# Patient Record
Sex: MALE | Race: WHITE
[De-identification: names, ages, dates, MRNs, and addresses within clinical notes are randomized per-mention and may not be internally consistent; named-entity substitution may affect disease eponyms.]

---

## 2019-11-07 ENCOUNTER — HOSPITAL ENCOUNTER (OUTPATIENT)
Dept: HOSPITAL 62 - ER | Age: 64
Setting detail: OBSERVATION
LOS: 2 days | Discharge: HOME | End: 2019-11-09
Attending: INTERNAL MEDICINE | Admitting: INTERNAL MEDICINE
Payer: MEDICARE

## 2019-11-07 DIAGNOSIS — Z91.19: ICD-10-CM

## 2019-11-07 DIAGNOSIS — E66.9: ICD-10-CM

## 2019-11-07 DIAGNOSIS — I89.0: ICD-10-CM

## 2019-11-07 DIAGNOSIS — R11.0: ICD-10-CM

## 2019-11-07 DIAGNOSIS — M62.81: ICD-10-CM

## 2019-11-07 DIAGNOSIS — I95.9: ICD-10-CM

## 2019-11-07 DIAGNOSIS — Z79.899: ICD-10-CM

## 2019-11-07 DIAGNOSIS — R94.31: ICD-10-CM

## 2019-11-07 DIAGNOSIS — R55: Primary | ICD-10-CM

## 2019-11-07 DIAGNOSIS — Z90.49: ICD-10-CM

## 2019-11-07 DIAGNOSIS — Z83.3: ICD-10-CM

## 2019-11-07 DIAGNOSIS — I10: ICD-10-CM

## 2019-11-07 DIAGNOSIS — G60.0: ICD-10-CM

## 2019-11-07 DIAGNOSIS — G47.33: ICD-10-CM

## 2019-11-07 LAB
ADD MANUAL DIFF: NO
ALBUMIN SERPL-MCNC: 3.9 G/DL (ref 3.5–5)
ALP SERPL-CCNC: 53 U/L (ref 38–126)
ANION GAP SERPL CALC-SCNC: 12 MMOL/L (ref 5–19)
APPEARANCE UR: (no result)
APTT PPP: YELLOW S
AST SERPL-CCNC: 24 U/L (ref 17–59)
BARBITURATES UR QL SCN: NEGATIVE
BASOPHILS # BLD AUTO: 0.1 10^3/UL (ref 0–0.2)
BASOPHILS NFR BLD AUTO: 0.8 % (ref 0–2)
BILIRUB DIRECT SERPL-MCNC: 0.2 MG/DL (ref 0–0.4)
BILIRUB SERPL-MCNC: 0.8 MG/DL (ref 0.2–1.3)
BILIRUB UR QL STRIP: NEGATIVE
BUN SERPL-MCNC: 10 MG/DL (ref 7–20)
CALCIUM: 8.9 MG/DL (ref 8.4–10.2)
CHLORIDE SERPL-SCNC: 97 MMOL/L (ref 98–107)
CK MB SERPL-MCNC: 4.59 NG/ML (ref ?–4.55)
CK SERPL-CCNC: 249 U/L (ref 55–170)
CO2 SERPL-SCNC: 25 MMOL/L (ref 22–30)
EOSINOPHIL # BLD AUTO: 0.2 10^3/UL (ref 0–0.6)
EOSINOPHIL NFR BLD AUTO: 1.6 % (ref 0–6)
ERYTHROCYTE [DISTWIDTH] IN BLOOD BY AUTOMATED COUNT: 13 % (ref 11.5–14)
ETHANOL SERPL-MCNC: < 10 MG/DL
GLUCOSE SERPL-MCNC: 126 MG/DL (ref 75–110)
GLUCOSE UR STRIP-MCNC: NEGATIVE MG/DL
HCT VFR BLD CALC: 40.4 % (ref 37.9–51)
HGB BLD-MCNC: 14.1 G/DL (ref 13.5–17)
KETONES UR STRIP-MCNC: NEGATIVE MG/DL
LYMPHOCYTES # BLD AUTO: 2.4 10^3/UL (ref 0.5–4.7)
LYMPHOCYTES NFR BLD AUTO: 24.9 % (ref 13–45)
MCH RBC QN AUTO: 32.4 PG (ref 27–33.4)
MCHC RBC AUTO-ENTMCNC: 34.8 G/DL (ref 32–36)
MCV RBC AUTO: 93 FL (ref 80–97)
METHADONE UR QL SCN: NEGATIVE
MONOCYTES # BLD AUTO: 0.9 10^3/UL (ref 0.1–1.4)
MONOCYTES NFR BLD AUTO: 8.8 % (ref 3–13)
NEUTROPHILS # BLD AUTO: 6.2 10^3/UL (ref 1.7–8.2)
NEUTS SEG NFR BLD AUTO: 63.9 % (ref 42–78)
NITRITE UR QL STRIP: NEGATIVE
PCP UR QL SCN: NEGATIVE
PH UR STRIP: 6 [PH] (ref 5–9)
PLATELET # BLD: 211 10^3/UL (ref 150–450)
POTASSIUM SERPL-SCNC: 4.1 MMOL/L (ref 3.6–5)
PROT SERPL-MCNC: 6.5 G/DL (ref 6.3–8.2)
PROT UR STRIP-MCNC: 30 MG/DL
RBC # BLD AUTO: 4.34 10^6/UL (ref 4.35–5.55)
SP GR UR STRIP: 1.01
TOTAL CELLS COUNTED % (AUTO): 100 %
TROPONIN I SERPL-MCNC: < 0.012 NG/ML
URINE AMPHETAMINES SCREEN: NEGATIVE
URINE BENZODIAZEPINES SCREEN: NEGATIVE
URINE COCAINE SCREEN: NEGATIVE
URINE MARIJUANA (THC) SCREEN: NEGATIVE
UROBILINOGEN UR-MCNC: 4 MG/DL (ref ?–2)
WBC # BLD AUTO: 9.8 10^3/UL (ref 4–10.5)

## 2019-11-07 PROCEDURE — 96374 THER/PROPH/DIAG INJ IV PUSH: CPT

## 2019-11-07 PROCEDURE — 72125 CT NECK SPINE W/O DYE: CPT

## 2019-11-07 PROCEDURE — 93010 ELECTROCARDIOGRAM REPORT: CPT

## 2019-11-07 PROCEDURE — 83735 ASSAY OF MAGNESIUM: CPT

## 2019-11-07 PROCEDURE — 96361 HYDRATE IV INFUSION ADD-ON: CPT

## 2019-11-07 PROCEDURE — 99285 EMERGENCY DEPT VISIT HI MDM: CPT

## 2019-11-07 PROCEDURE — 83690 ASSAY OF LIPASE: CPT

## 2019-11-07 PROCEDURE — 80053 COMPREHEN METABOLIC PANEL: CPT

## 2019-11-07 PROCEDURE — 85379 FIBRIN DEGRADATION QUANT: CPT

## 2019-11-07 PROCEDURE — 84484 ASSAY OF TROPONIN QUANT: CPT

## 2019-11-07 PROCEDURE — 71045 X-RAY EXAM CHEST 1 VIEW: CPT

## 2019-11-07 PROCEDURE — 81001 URINALYSIS AUTO W/SCOPE: CPT

## 2019-11-07 PROCEDURE — 36415 COLL VENOUS BLD VENIPUNCTURE: CPT

## 2019-11-07 PROCEDURE — 93306 TTE W/DOPPLER COMPLETE: CPT

## 2019-11-07 PROCEDURE — 80307 DRUG TEST PRSMV CHEM ANLYZR: CPT

## 2019-11-07 PROCEDURE — 93005 ELECTROCARDIOGRAM TRACING: CPT

## 2019-11-07 PROCEDURE — 82553 CREATINE MB FRACTION: CPT

## 2019-11-07 PROCEDURE — 85025 COMPLETE CBC W/AUTO DIFF WBC: CPT

## 2019-11-07 PROCEDURE — G0378 HOSPITAL OBSERVATION PER HR: HCPCS

## 2019-11-07 PROCEDURE — 70450 CT HEAD/BRAIN W/O DYE: CPT

## 2019-11-07 PROCEDURE — 82550 ASSAY OF CK (CPK): CPT

## 2019-11-07 NOTE — EKG REPORT
SEVERITY:- ABNORMAL ECG -

SINUS RHYTHM

CONSIDER ANTERIOR INFARCT

NONSPECIFIC T ABNORMALITIES, LATERAL LEADS

BORDERLINE PROLONGED QT INTERVAL

:

Confirmed by: Mack Layne 07-Nov-2019 22:47:24

## 2019-11-07 NOTE — ER DOCUMENT REPORT
ED General





- General


Chief Complaint: Unresponsive


Stated Complaint: UNRESPONSIVE


Time Seen by Provider: 11/07/19 20:15


Notes: 


Patient is a 64-year-old male that comes to the emergency department for chief 

complaint of being found unresponsive by his girlfriend at 2030, patient states 

he ate dinner, then he woke up on the floor with "EMS prodding at me".  EMS 

reported his initial blood pressure was 66/34.  He did arouse on his own 

however.  He started getting nauseated and was given 4 mg of Zofran, however 

patient denies vomiting, chest pain, dizziness, fever, shortness of breath, or 

any other complaints during the day.  He does admit to regular alcohol and he 

drank reportedly 4 beers tonight.  He is treated for hypertension with amlod

ipine, metoprolol, however he is on no other medications prescribed, he denies 

history of recreational drugs or current recreational drugs, he denies any 

current symptoms other than nausea including denying chest pain, headache, focal

numbness or weakness, shortness of breath.  He lives at home by himself.





- Related Data


Allergies/Adverse Reactions: 


                                        





No Known Allergies Allergy (Unverified 07/28/14 08:03)


   








Home Medications: HCTZ, METOPROLOL SUCC ER, AMLODIPINE, CHLOR TABS





Past Medical History





- General


Information source: Patient, Relative





- Social History


Smoking Status: Never Smoker


Frequency of alcohol use: Rare


Lives with: Spouse/Significant other


Family History: Reviewed & Not Pertinent


Patient has suicidal ideation: No


Patient has homicidal ideation: No





- Past Medical History


Cardiac Medical History: Reports: Hx Hypertension


Past Surgical History: Reports: Hx Appendectomy





- Immunizations


Immunizations up to date: Yes


Hx Diphtheria, Pertussis, Tetanus Vaccination: Yes





Review of Systems





- Review of Systems


Constitutional: No symptoms reported


EENT: No symptoms reported


Cardiovascular: See HPI


Respiratory: No symptoms reported


Gastrointestinal: No symptoms reported


Genitourinary: No symptoms reported


Male Genitourinary: No symptoms reported


Musculoskeletal: No symptoms reported


Skin: No symptoms reported


Hematologic/Lymphatic: No symptoms reported


Neurological/Psychological: See HPI





Physical Exam





- Vital signs


Vitals: 


                                        











Resp


 


 14 


 


 11/07/19 20:08














- Notes


Notes: 





GENERAL: Alert, interacts well. No acute distress.


HEAD: Normocephalic, atraumatic.


EYES: Pupils equal, round, and reactive to light. Extraocular movements intact.


ENT: Oral mucosa moist, tongue midline. Oropharynx unremarkable. Airway patent. 


NECK: Full range of motion. Supple. Trachea midline.


LUNGS: Clear to auscultation bilaterally, no wheezes, rales, or rhonchi. No 

respiratory distress.


HEART: Regular rate and rhythm. No murmur


ABDOMEN: Soft, non-tender. Non-distended.  Obese


GENITOURINARY: Deferred


EXTREMITIES: Moves all 4 extremities spontaneously.  Notable right-sided 

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


BACK: no cervical, thoracic, lumbar midline tenderness. No saddle anesthesia, 

normal distal neurovascular exam. Moves all extremities in full range of motion.


NEUROLOGICAL: Alert and oriented x3. Normal speech. Cranial nerves II through 

XII grossly intact. 


PSYCH: Normal affect, normal mood.


SKIN: Warm, dry, normal turgor. No rashes or lesions noted.





Course





- Re-evaluation


Re-evalutation: 


Because of reported alcohol earlier CT of the head and neck were performed but 

these were unremarkable.  Patient given IV fluids.





Patient is not hypotensive here.  He got nauseated and required nausea 

medication, he started gagging.  He has a soft benign abdomen.  He has no chest 

pain, he has no headache, he has no other complaints.  No signs of trauma.  He 

has chronic right-sided lymphedema.





Chest x-ray unremarkable except for possible aspiration although patient has no 

respiratory symptoms on my evaluation.  EKG nonspecific.  CBC, chemistry, 

troponin unremarkable.  CK-MB borderline and ordered on protocol, I did not 

order this.  Patient is symptomatically reevaluation.  I did discuss with Dr. Garrison, he recommends d-dimer, repeat troponin, admission for telemetry 

observation if this is normal and CTA if the d-dimer is abnormal.  Patient is 

very agreeable with this plan.





D-dimer negative, second troponin negative.  Discussed with patient.  He is 

uncomfortable going home, because I do not know the cause of his hypotension and

syncope he will be discussed with internal medicine.  I discussed with Dr. Brizuela, he accepts patient to telemetry observation.











- Vital Signs


Vital signs: 


                                        











Temp Pulse Resp BP Pulse Ox


 


 98.0 F   73   16   175/101 H  96 


 


 11/08/19 04:07  11/08/19 04:07  11/08/19 04:07  11/08/19 04:07  11/08/19 04:07














- Laboratory


Result Diagrams: 


                                 11/07/19 20:00





                                 11/07/19 20:00


Laboratory results interpreted by me: 


                                        











  11/07/19 11/07/19 11/07/19





  20:00 20:00 20:00


 


RBC  4.34 L  


 


Sodium   133.9 L 


 


Chloride   97 L 


 


Glucose   126 H 


 


Creatine Kinase   249 H 


 


CK-MB (CK-2)    4.59 H


 


Urine Protein   


 


Urine Urobilinogen   














  11/07/19





  20:20


 


RBC 


 


Sodium 


 


Chloride 


 


Glucose 


 


Creatine Kinase 


 


CK-MB (CK-2) 


 


Urine Protein  30 H


 


Urine Urobilinogen  4.0 H














- EKG Interpretation by Me


Additional EKG results interpreted by me: 


EKG shows sinus rhythm at a rate of 60, borderline QT interval at 480, normal 

axis.  Inverted T wave in lead aVL but no T wave inversions or ST segment 

changes in consecutive leads.





Discharge





- Discharge


Clinical Impression: 


Episode of syncope


Qualifiers:


 Syncope type: unspecified Qualified Code(s): R55 - Syncope and collapse





Hypotension


Qualifiers:


 Hypotension type: unspecified hypotension type Qualified Code(s): I95.9 - 

Hypotension, unspecified





Condition: Stable


Disposition: ADMITTED AS OBSERVATION


Admitting Provider: Gelacio (Hospitalist)


Unit Admitted: Telemetry

## 2019-11-07 NOTE — RADIOLOGY REPORT (SQ)
EXAM DESCRIPTION: 



XR CHEST 1 VIEW



COMPLETED DATE/TME:  11/07/2019 20:23



CLINICAL HISTORY: 



64 years, Male, hypotension



COMPARISON:

Prior chest radiograph from 7/28/2014



NUMBER OF VIEWS:

One



TECHNIQUE:

Single frontal view of the chest was obtained portably



LIMITATIONS:

None.



FINDINGS:



Cardiac and mediastinal contours are stable. Patchy bibasilar

opacity is noted with blunting of both costophrenic sulci. No

pneumothorax.



IMPRESSION:



Suspect small bilateral pleural effusions with bibasilar airspace

disease, likely atelectasis although pneumonia/aspiration are

also possible.

 



copyright 2011 [employer]- All Rights Reserved

## 2019-11-07 NOTE — RADIOLOGY REPORT (SQ)
EXAM DESCRIPTION: 



CT CERVICAL SPINE WITHOUT IV CONTRAST



COMPLETED DATE/TME:  11/07/2019 20:26



CLINICAL HISTORY: 



64 years, Male, ? head injury, ETOH



This exam was performed according to our departmental

dose-optimization program which includes automated exposure

control, adjustment of the mA and/or kVp according to patient

size and/or use of iterative reconstruction technique where

applicable.



FINDINGS: Vertebral body heights are intact. Alignment is intact.

No subluxation. Mild degenerative changes. No significant

prevertebral soft tissue swelling. The odontoid process is

intact. 



IMPRESSION:



Mild degenerative changes. No fracture or subluxation.

## 2019-11-07 NOTE — RADIOLOGY REPORT (SQ)
EXAM DESCRIPTION: 



CT HEAD WITHOUT IV CONTRAST



COMPLETED DATE/TME:  11/07/2019 20:25



CLINICAL HISTORY: 



64 years, Male, passed out, ? head injury, ETOH



This exam was performed according to our departmental

dose-optimization program which includes automated exposure

control, adjustment of the mA and/or kVp according to patient

size and/or use of iterative reconstruction technique where

applicable.



FINDINGS: No acute intracranial hemorrhage, mass effect or

midline shift. No extra-axial fluid collections. Ventricles and

subarachnoid spaces are mildly dilated consistent with cerebral

atrophy. Mild patchy hypodense areas in periventricular white

matter both cerebral hemispheres consistent with chronic small

vessel ischemic changes. Visualized paranasal sinuses and the

mastoid air cells are clear. The skull is intact.



IMPRESSION:



No acute intracranial hemorrhage. Mild chronic ischemic changes.

## 2019-11-08 RX ADMIN — MAGNESIUM SULFATE IN DEXTROSE SCH MLS/HR: 10 INJECTION, SOLUTION INTRAVENOUS at 04:13

## 2019-11-08 RX ADMIN — METOPROLOL SUCCINATE SCH MG: 25 TABLET, EXTENDED RELEASE ORAL at 11:05

## 2019-11-08 RX ADMIN — HEPARIN SODIUM SCH UNIT: 5000 INJECTION, SOLUTION INTRAVENOUS; SUBCUTANEOUS at 15:21

## 2019-11-08 RX ADMIN — LOSARTAN POTASSIUM SCH MG: 50 TABLET, FILM COATED ORAL at 15:20

## 2019-11-08 RX ADMIN — MAGNESIUM SULFATE IN DEXTROSE SCH MLS/HR: 10 INJECTION, SOLUTION INTRAVENOUS at 04:09

## 2019-11-08 RX ADMIN — HEPARIN SODIUM SCH UNIT: 5000 INJECTION, SOLUTION INTRAVENOUS; SUBCUTANEOUS at 05:40

## 2019-11-08 RX ADMIN — METOPROLOL SUCCINATE SCH: 25 TABLET, EXTENDED RELEASE ORAL at 11:05

## 2019-11-08 RX ADMIN — HEPARIN SODIUM SCH UNIT: 5000 INJECTION, SOLUTION INTRAVENOUS; SUBCUTANEOUS at 21:18

## 2019-11-08 RX ADMIN — AMLODIPINE BESYLATE SCH: 10 TABLET ORAL at 11:05

## 2019-11-08 RX ADMIN — AMLODIPINE BESYLATE SCH MG: 10 TABLET ORAL at 11:04

## 2019-11-08 NOTE — PROGRESS NOTE
Provider Note


Provider Note: 





Had conversation with patient and family member who witnessed the episode.  

Patient's family member verified from the machine that she is to measure patient

blood pressure at the time of the event which show the patient's blood pressure 

was 64/40s with a heart rate of 52 at the time.  This is a third time this is a

ccording the past few years.  Patient's most recent added medication was 

metoprolol succinate.  The long QT on patient's EKG has currently resolved as 

QTC is now 440 ms.  It is unlikely that this was caused by any of patient's 

recent medications.  I doubt that this contributed.  I believe patient's 

syncopal episode was secondary to neurocardiogenic syncope given the current 

evidence.  Telemetry reviewed showing occasional APCs.  We will get an 

echocardiogram.  Patient recommended for outpatient tilt table test.

## 2019-11-08 NOTE — PDOC H&P
History of Present Illness


Admission Date/PCP: 


  11/08/19 02:28





  HERMELINDO MEJIA





Patient complains of: Passing out


History of Present Illness: 


JAUN BONE is a 64 year old male with a past medical history of morbid 

obesity, obstructive sleep apnea hypertension, Luana Charcot tooth with 

subsequent right lower extremity lymphedema and left-sided muscle weakness.  He 

presents via EMS after having a witnessed unprovoked syncopal episode.  It 

occurred while sitting at the table denies palpitations, dizziness or preceding 

complaint.  He admits this has occurred once before several years ago.  He 

admits to a new unknown blood pressure medication.  In the emergency room he is 

found to have a prolonged QT interval but otherwise unremarkable.  He is 

referred to the hospitalist for admission.  He denies chest pain palpitations 

nausea vomiting or shortness of breath, he admits noncompliance with BiPAP.  He 

has scheduled follow-up with [facility] next week.





Past Medical History


Cardiac Medical History: Reports: Hypertension





Past Surgical History


Past Surgical History: Reports: Appendectomy





Social History


Information Source: Patient, [facility]


Lives with: Friend


Smoking Status: Never Smoker


Electronic Cigarette use?: No


Frequency of Alcohol Use: Social


Hx Recreational Drug Use: No


Hx Prescription Drug Abuse: No





- Advance Directive


Resuscitation Status: Full Code





Family History


Family History: DM


Parental Family History Reviewed: Yes


Children Family History Reviewed: Yes


Sibling(s) Family History Reviewed.: Yes





Medication/Allergy


Home Medications: 








Amlodipine Besylate 1 tab PO DAILY 07/28/14 


Amlodipine Besylate [Norvasc 10 mg Tablet] 10 mg PO DAILY #30 tablet 07/28/14 


Benazepril HCl 40 mg PO DAILY 07/28/14 


Benazepril HCl [Lotensin] 40 mg PO DAILY #30 tablet 07/28/14 


Clonidine HCl [Catapres 0.2 mg Tablet] 0.2 mg PO Q12 07/28/14 


Clonidine HCl [Catapres] 0.1 mg PO Q12 #60 tab 07/28/14 


Clonidine HCl [Catapres] 0.2 mg PO BID #60 tablet 07/28/14 


Clonidine HCl [Clonidine HCl ER] 0.1 mg PO Q12 07/28/14 


Oxycodone HCl/Acetaminophen [Percocet 5-325 mg Tablet] 1 - 2 tab PO ASDIR PRN 

#30 tablet 07/28/14 








Allergies/Adverse Reactions: 


                                        





No Known Allergies Allergy (Unverified 07/28/14 08:03)


   











Review of Systems


Constitutional: ABSENT: chills, fever(s), headache(s), weight gain, weight loss


Eyes: ABSENT: visual disturbances


Ears: ABSENT: hearing changes


Cardiovascular: ABSENT: chest pain, dyspnea on exertion, edema, orthropnea, 

palpitations


Respiratory: ABSENT: cough, hemoptysis


Gastrointestinal: ABSENT: abdominal pain, constipation, diarrhea, hematemesis, 

hematochezia, nausea, vomiting


Genitourinary: ABSENT: dysuria, hematuria


Musculoskeletal: ABSENT: joint swelling


Integumentary: ABSENT: rash, wounds


Neurological: ABSENT: abnormal gait, abnormal speech, confusion, dizziness, 

focal weakness, syncope


Psychiatric: ABSENT: anxiety, depression, homidical ideation, suicidal ideation


Endocrine: ABSENT: cold intolerance, heat intolerance, polydipsia, polyuria


Hematologic/Lymphatic: ABSENT: easy bleeding, easy bruising





Physical Exam


Vital Signs: 


                                        











Temp Pulse Resp BP Pulse Ox


 


 98.0 F   73   16   175/101 H  96 


 


 11/08/19 04:07  11/08/19 04:07  11/08/19 04:07  11/08/19 04:07  11/08/19 04:07








                                 Intake & Output











 11/06/19 11/07/19 11/08/19





 11:59 11:59 11:59


 


Intake Total   1307


 


Output Total   2000


 


Balance   -693


 


Weight   166.5 kg











General appearance: PRESENT: no acute distress, well-developed, well-nourished


Head exam: PRESENT: atraumatic, normocephalic


Eye exam: PRESENT: conjunctiva pink, EOMI, PERRLA.  ABSENT: scleral icterus


Ear exam: PRESENT: normal external ear exam


Mouth exam: PRESENT: moist, tongue midline


Neck exam: ABSENT: carotid bruit, JVD, lymphadenopathy, thyromegaly


Respiratory exam: PRESENT: clear to auscultation yulissa.  ABSENT: rales, rhonchi, 

wheezes


Cardiovascular exam: PRESENT: RRR.  ABSENT: diastolic murmur, rubs, systolic 

murmur


Pulses: PRESENT: normal dorsalis pedis pul


Vascular exam: PRESENT: normal capillary refill


GI/Abdominal exam: PRESENT: normal bowel sounds, soft.  ABSENT: distended, guar

ding, mass, organolmegaly, rebound, tenderness


Rectal exam: PRESENT: deferred


Extremities exam: PRESENT: full ROM.  ABSENT: calf tenderness, clubbing, pedal 

edema


Neurological exam: PRESENT: alert, awake, oriented to person, oriented to place,

oriented to time, oriented to situation, CN II-XII grossly intact.  ABSENT: 

motor sensory deficit


Psychiatric exam: PRESENT: appropriate affect, normal mood.  ABSENT: homicidal 

ideation, suicidal ideation


Skin exam: PRESENT: dry, intact, warm.  ABSENT: cyanosis, rash





Results


Laboratory Results: 


                                        





                                 11/07/19 20:00 





                                 11/07/19 20:00 





                                        











  11/07/19 11/07/19 11/07/19





  20:00 20:00 20:00


 


WBC  9.8  


 


RBC  4.34 L  


 


Hgb  14.1  


 


Hct  40.4  


 


MCV  93  


 


MCH  32.4  


 


MCHC  34.8  


 


RDW  13.0  


 


Plt Count  211  


 


Seg Neutrophils %  63.9  


 


Sodium   133.9 L 


 


Potassium   4.1 


 


Chloride   97 L 


 


Carbon Dioxide   25 


 


Anion Gap   12 


 


BUN   10 


 


Creatinine   0.69 


 


Est GFR ( Amer)   > 60 


 


Glucose   126 H 


 


Calcium   8.9 


 


Magnesium   


 


Total Bilirubin   0.8 


 


AST   24 


 


Alkaline Phosphatase   53 


 


Total Protein   6.5 


 


Albumin   3.9 


 


Lipase    44.6


 


Urine Color   


 


Urine Appearance   


 


Urine pH   


 


Ur Specific Gravity   


 


Urine Protein   


 


Urine Glucose (UA)   


 


Urine Ketones   


 


Urine Blood   


 


Urine Nitrite   


 


Ur Leukocyte Esterase   


 


Urine WBC (Auto)   


 


Urine RBC (Auto)   














  11/07/19 11/08/19





  20:20 00:00


 


WBC  


 


RBC  


 


Hgb  


 


Hct  


 


MCV  


 


MCH  


 


MCHC  


 


RDW  


 


Plt Count  


 


Seg Neutrophils %  


 


Sodium  


 


Potassium  


 


Chloride  


 


Carbon Dioxide  


 


Anion Gap  


 


BUN  


 


Creatinine  


 


Est GFR (African Amer)  


 


Glucose  


 


Calcium  


 


Magnesium   2.0


 


Total Bilirubin  


 


AST  


 


Alkaline Phosphatase  


 


Total Protein  


 


Albumin  


 


Lipase  


 


Urine Color  YELLOW 


 


Urine Appearance  SLIGHTLY-CLOUDY 


 


Urine pH  6.0 


 


Ur Specific Gravity  1.014 


 


Urine Protein  30 H 


 


Urine Glucose (UA)  NEGATIVE 


 


Urine Ketones  NEGATIVE 


 


Urine Blood  NEGATIVE 


 


Urine Nitrite  NEGATIVE 


 


Ur Leukocyte Esterase  NEGATIVE 


 


Urine WBC (Auto)  4 


 


Urine RBC (Auto)  1 








                                        











  11/07/19 11/07/19 11/08/19





  20:00 20:00 00:00


 


Creatine Kinase  249 H  


 


CK-MB (CK-2)   4.59 H 


 


Troponin I   < 0.012  < 0.012











Impressions: 


                                        





Chest X-Ray  11/07/19 20:23


IMPRESSION:


 


Suspect small bilateral pleural effusions with bibasilar airspace


disease, likely atelectasis although pneumonia/aspiration are


also possible.


 


 


copyright 2011 [employer]- All Rights Reserved


 








Head CT  11/07/19 20:25


IMPRESSION:


 


No acute intracranial hemorrhage. Mild chronic ischemic changes. 


 








Cervical Spine CT  11/07/19 20:26


IMPRESSION:


 


Mild degenerative changes. No fracture or subluxation.


 














Assessment and Plan





- Diagnosis


(1) Prolonged QT interval


Is this a current diagnosis for this admission?: Yes   


Plan: 


No old EKG, possibly secondary to new blood pressure agent, magnesium sulfate 

ordered follow-up telemetry monitoring and a.m. EKG consider cardiology consult








(2) Obstructive sleep apnea


Is this a current diagnosis for this admission?: Yes   


Plan: 


BiPAP and education








(3) Hypertension


Is this a current diagnosis for this admission?: Yes   


Plan: 


ACE inhibitor, amlodipine and hydralazine PRN








(4) Episode of syncope


Qualifiers: 


   Syncope type: unspecified   Qualified Code(s): R55 - Syncope and collapse   


Is this a current diagnosis for this admission?: Yes   


Plan: 


Secondary to #1








- Time


Time Spent with patient: 25-34 minutes





- Inpatient Certification


Medical Necessity: Need Close Monitoring Due to Risk of Patient Decompensation

## 2019-11-09 VITALS — DIASTOLIC BLOOD PRESSURE: 74 MMHG | SYSTOLIC BLOOD PRESSURE: 152 MMHG

## 2019-11-09 RX ADMIN — LOSARTAN POTASSIUM SCH MG: 50 TABLET, FILM COATED ORAL at 09:33

## 2019-11-09 RX ADMIN — HEPARIN SODIUM SCH UNIT: 5000 INJECTION, SOLUTION INTRAVENOUS; SUBCUTANEOUS at 05:13

## 2019-11-09 RX ADMIN — AMLODIPINE BESYLATE SCH MG: 10 TABLET ORAL at 09:33

## 2019-11-09 NOTE — EKG REPORT
SEVERITY:- ABNORMAL ECG -

SINUS RHYTHM

ATRIAL PREMATURE COMPLEX

PROBABLE LEFT VENTRICULAR HYPERTROPHY

:

Confirmed by: Mack Layne 09-Nov-2019 22:09:03

## 2019-11-09 NOTE — PDOC DISCHARGE SUMMARY
Impression





- Admit/DC Date/PCP


Admission Date/Primary Care Provider: 


  11/08/19 02:28





  HERMELINDO MEJIA





Discharge Date: 11/09/19





- Assessment


Summary: 


Mr. Bone was admitted after experiencing an episode of syncope.  The episode 

occurred while patient was in a seated position.  Was witnessed by his family 

member.  At that time she measured the patient's blood pressure and heart rate 

and confirmed that he was in the 60s/40s with a heart rate of 52.  In the 

hospital on admission, CT head was unremarkable.  There was initial concern for 

prolonged QT interval on the concerned that this may have been attributed to her

recent medication that he started.  A little confirmed that the recent 

medication was metoprolol succinate which should not have any effect on his QT 

interval.  A repeat EKG the next morning showed a normalized QTC of 430-440 ms. 

Review of telemetry showed no arrhythmia besides occasional infrequent APCs.  I 

believe given the evidence the patient's syncope was secondary to 

neurocardiogenic [vasovagal] syncope.  An echocardiogram was also performed and 

patient has been given strict instructions to contact the hospital after his 

discharge to have the results of the echo faxed to the cardiologist that he is 

currently being set up with by his PCP.  I recommended to patient that once he 

is fully set up with the cardiologist outpatient, to get a tilt table test 

performed.  Given patient strict instructions for possible symptoms that may 

precede syncopal episode, and how to avoid syncope such as situation.  I also 

stopped the metoprolol given the bradycardia at the time of the event and states

 his current heart rates in the hospital have been in the 60s to low 70s.  I 

increase his telmisartan/HCTZ from 80/12.5 to 80/25 mg daily.





- Additional Information


Resuscitation Status: Full Code


Discharge Diet: As Tolerated


Discharge Activity: Activity As Tolerated


Referrals: 


RIKI SCHMIDT PA [Primary Care Provider] - Follow up as needed


Prescriptions: 


Telmisartan/Hydrochlorothiazid [Telmisartan-Hctz 80-25 mg Tab] 1 each PO DAILY 

#30 tablet


Home Medications: 








Amlodipine Besylate [Norvasc 10 mg Tablet] 10 mg PO DAILY 11/08/19 


Cetirizine HCl [Zyrtec 10 mg Tablet] 10 mg PO DAILY 11/08/19 


Chlorpheniramine Maleate [Chlor-Trimeton 4 mg Tablet] 4 mg PO DAILY 11/08/19 


Montelukast Sodium [Singulair 10 mg Tablet] 10 mg PO QHS 11/08/19 


Telmisartan/Hydrochlorothiazid [Telmisartan-Hctz 80-25 mg Tab] 1 each PO DAILY 

#30 tablet 11/09/19 











History of Present Illiness


History of Present Illness: 


JAUN BONE is a 64 year old male with a past medical history of morbid 

obesity, obstructive sleep apnea hypertension, Luana Charcot tooth with 

subsequent right lower extremity lymphedema and left-sided muscle weakness.  He 

presents via EMS after having a witnessed unprovoked syncopal episode.  It 

occurred while sitting at the table denies palpitations, dizziness or preceding 

complaint.  He admits this has occurred once before several years ago.  He 

admits to a new unknown blood pressure medication.  In the emergency room he is 

found to have a prolonged QT interval but otherwise unremarkable.  He is 

referred to the hospitalist for admission.  He denies chest pain palpitations 

nausea vomiting or shortness of breath, he admits noncompliance with BiPAP.  He 

has scheduled follow-up with [place] [facility] next week.








Physical Exam


Vital Signs: 


                                        











Temp Pulse Resp BP Pulse Ox


 


 97.5 F   72   18   152/74 H  97 


 


 11/09/19 11:55  11/09/19 11:55  11/09/19 11:55  11/09/19 11:55  11/09/19 11:55








                                 Intake & Output











 11/08/19 11/09/19 11/10/19





 06:59 06:59 06:59


 


Intake Total 1307 866 


 


Output Total 2300 2750 


 


Balance -993 -1884 


 


Weight 166.5 kg 166.2 kg 











General appearance: PRESENT: no acute distress


Head exam: PRESENT: atraumatic, normocephalic


Eye exam: PRESENT: EOMI


Mouth exam: PRESENT: moist


Neck exam: ABSENT: JVD


Respiratory exam: PRESENT: clear to auscultation yulissa


Cardiovascular exam: PRESENT: +S1, +S2.  ABSENT: diastolic murmur, systolic 

murmur, tachycardia


GI/Abdominal exam: PRESENT: normal bowel sounds, soft.  ABSENT: rebound, rigid, 

tenderness


Neurological exam: PRESENT: alert, awake, oriented to person, oriented to place,

oriented to time, oriented to situation, CN II-XII grossly intact.  ABSENT: 

motor sensory deficit


Psychiatric exam: ABSENT: agitated, anxious





Results


Laboratory Results: 


                                        











WBC  9.8 10^3/uL (4.0-10.5)   11/07/19  20:00    


 


RBC  4.34 10^6/uL (4.35-5.55)  L  11/07/19  20:00    


 


Hgb  14.1 g/dL (13.5-17.0)   11/07/19  20:00    


 


Hct  40.4 % (37.9-51.0)   11/07/19  20:00    


 


MCV  93 fl (80-97)   11/07/19  20:00    


 


MCH  32.4 pg (27.0-33.4)   11/07/19  20:00    


 


MCHC  34.8 g/dL (32.0-36.0)   11/07/19  20:00    


 


RDW  13.0 % (11.5-14.0)   11/07/19  20:00    


 


Plt Count  211 10^3/uL (150-450)   11/07/19  20:00    


 


Lymph % (Auto)  24.9 % (13-45)   11/07/19  20:00    


 


Mono % (Auto)  8.8 % (3-13)   11/07/19  20:00    


 


Eos % (Auto)  1.6 % (0-6)   11/07/19  20:00    


 


Baso % (Auto)  0.8 % (0-2)   11/07/19  20:00    


 


Absolute Neuts (auto)  6.2 10^3/uL (1.7-8.2)   11/07/19  20:00    


 


Absolute Lymphs (auto)  2.4 10^3/uL (0.5-4.7)   11/07/19  20:00    


 


Absolute Monos (auto)  0.9 10^3/uL (0.1-1.4)   11/07/19  20:00    


 


Absolute Eos (auto)  0.2 10^3/uL (0.0-0.6)   11/07/19  20:00    


 


Absolute Basos (auto)  0.1 10^3/uL (0.0-0.2)   11/07/19  20:00    


 


Seg Neutrophils %  63.9 % (42-78)   11/07/19  20:00    


 


D-Dimer  0.35 ug/mL (0.00-0.50)   11/07/19  20:00    


 


Sodium  133.9 mmol/L (137-145)  L  11/07/19  20:00    


 


Potassium  4.1 mmol/L (3.6-5.0)   11/07/19  20:00    


 


Chloride  97 mmol/L ()  L  11/07/19  20:00    


 


Carbon Dioxide  25 mmol/L (22-30)   11/07/19  20:00    


 


Anion Gap  12  (5-19)   11/07/19  20:00    


 


BUN  10 mg/dL (7-20)   11/07/19  20:00    


 


Creatinine  0.69 mg/dL (0.52-1.25)   11/07/19  20:00    


 


Est GFR ( Amer)  > 60  (>60)   11/07/19  20:00    


 


Est GFR (MDRD) Non-Af  > 60  (>60)   11/07/19  20:00    


 


Glucose  126 mg/dL ()  H  11/07/19  20:00    


 


Calcium  8.9 mg/dL (8.4-10.2)   11/07/19  20:00    


 


Magnesium  2.0 mg/dL (1.6-2.3)   11/08/19  00:00    


 


Total Bilirubin  0.8 mg/dL (0.2-1.3)   11/07/19  20:00    


 


Direct Bilirubin  0.2 mg/dL (0.0-0.4)   11/07/19  20:00    


 


Neonat Total Bilirubin  Not Reportable   11/07/19  20:00    


 


Neonat Direct Bilirubin  Not Reportable   11/07/19  20:00    


 


Neonat Indirect Bili  Not Reportable   11/07/19  20:00    


 


AST  24 U/L (17-59)   11/07/19  20:00    


 


ALT  24 U/L (<50)   11/07/19  20:00    


 


Alkaline Phosphatase  53 U/L ()   11/07/19  20:00    


 


Creatine Kinase  249 U/L ()  H  11/07/19  20:00    


 


CK-MB (CK-2)  4.59 ng/mL (<4.55)  H  11/07/19  20:00    


 


Troponin I  < 0.012 ng/mL  11/08/19  00:00    


 


Total Protein  6.5 g/dL (6.3-8.2)   11/07/19  20:00    


 


Albumin  3.9 g/dL (3.5-5.0)   11/07/19  20:00    


 


Lipase  44.6 U/L ()   11/07/19  20:00    


 


Urine Color  YELLOW   11/07/19  20:20    


 


Urine Appearance  SLIGHTLY-CLOUDY   11/07/19  20:20    


 


Urine pH  6.0  (5.0-9.0)   11/07/19  20:20    


 


Ur Specific Gravity  1.014   11/07/19  20:20    


 


Urine Protein  30 mg/dL (NEGATIVE)  H  11/07/19  20:20    


 


Urine Glucose (UA)  NEGATIVE mg/dL (NEGATIVE)   11/07/19  20:20    


 


Urine Ketones  NEGATIVE mg/dL (NEGATIVE)   11/07/19  20:20    


 


Urine Blood  NEGATIVE  (NEGATIVE)   11/07/19  20:20    


 


Urine Nitrite  NEGATIVE  (NEGATIVE)   11/07/19  20:20    


 


Urine Bilirubin  NEGATIVE  (NEGATIVE)   11/07/19  20:20    


 


Urine Urobilinogen  4.0 mg/dL (<2.0)  H  11/07/19  20:20    


 


Ur Leukocyte Esterase  NEGATIVE  (NEGATIVE)   11/07/19  20:20    


 


Urine WBC (Auto)  4 /HPF  11/07/19  20:20    


 


Urine RBC (Auto)  1 /HPF  11/07/19  20:20    


 


U Hyaline Cast (Auto)  84 /LPF  11/07/19  20:20    


 


Squamous Epi Cells Auto  1 /HPF  11/07/19  20:20    


 


Urine Mucus (Auto)  FEW /LPF  11/07/19  20:20    


 


Urine Ascorbic Acid  NEGATIVE  (NEGATIVE)   11/07/19  20:20    


 


Urine Opiates Screen  NEGATIVE   11/07/19  20:20    


 


Urine Methadone Screen  NEGATIVE   11/07/19  20:20    


 


Ur Barbiturates Screen  NEGATIVE   11/07/19  20:20    


 


Ur Phencyclidine Scrn  NEGATIVE   11/07/19  20:20    


 


Ur Amphetamines Screen  NEGATIVE   11/07/19  20:20    


 


U Benzodiazepines Scrn  NEGATIVE   11/07/19  20:20    


 


Urine Cocaine Screen  NEGATIVE   11/07/19  20:20    


 


U Marijuana (THC) Screen  NEGATIVE   11/07/19  20:20    


 


Serum Alcohol  < 10 mg/dL (NONE DETECTED)   11/07/19  20:00    








                                        











  11/07/19 11/08/19





  20:00 00:00


 


CK-MB (CK-2)  4.59 H 


 


Troponin I  < 0.012  < 0.012











Impressions: 


                                        





Chest X-Ray  11/07/19 20:23


IMPRESSION:


 


Suspect small bilateral pleural effusions with bibasilar airspace


disease, likely atelectasis although pneumonia/aspiration are


also possible.


 


 


copyright 2011 [employer]- All Rights Reserved


 








Head CT  11/07/19 20:25


IMPRESSION:


 


No acute intracranial hemorrhage. Mild chronic ischemic changes. 


 








Cervical Spine CT  11/07/19 20:26


IMPRESSION:


 


Mild degenerative changes. No fracture or subluxation.


 














Stroke


Is this a Stroke Patient?: No





Acute Heart Failure





- **


Is this a Heart Failure Patient?: No

## 2019-11-10 NOTE — XCELERA REPORT
[address] [address]

                               Tel: [phone]

                               Fax: [phone]



                      Transthoracic Echocardiogram Report

_______________________________________________________________________________



Name: JAUN BONE

MRN: [id number]                           Age: 64 yrs

Gender: Male                              : 1955

Patient Status: Inpatient                 Patient Location: [address] [place], [place] [address]

Account #: [id number]

Study Date: 2019 02:26 PM

Accession #: [id number]

_______________________________________________________________________________



Height: 73 in        Weight: 367 lb        BSA: 2.8 m2

_______________________________________________________________________________

Procedure: A two-dimensional transthoracic echocardiogram with color flow and

Doppler was performed. The study was technically difficult with many images

being suboptimal in quality.

Reason For Study: syncope



History: SYNCOPE.

Ordering Physician: FILEMON OSORIO



Performed By: Leah Roblero

_______________________________________________________________________________



Interpretation Summary

The left ventricle is normal in size.

There is mild concentric left ventricular hypertrophy.

Left ventricular systolic function is normal.

LV EF is 65%

Doppler measurements suggest impaired left ventricular relaxation, which is

associated with grade I/IV or mild diastolic dysfunction

The left ventricular wall motion is normal.

There is no thrombus.

No ASD ,VSD , or PFO seen.

The right ventricle is grossly normal size.

The right ventricle is not well visualized secondary to technical limitations

The right atrium is normal.

The left atrial size is normal.

There is no vegetation seen on the mitral valve.

There is no mitral valve stenosis.

There is a trace amount of mitral regurgitation

There is no aortic valvular vegetation.

There is aortic sclerosis without aortic stenosis.

There is no LVOT obstruction.

No aortic regurgitation is present.

There is no tricuspid stenosis.

There is a trace amount of tricuspid regurgitation

Tricuspid regurgitation jet envelope not well defined to measure RV systolic

pressure accurately.

There is no pulmonic valvular stenosis.

There is a trace amount of pulmonic regurgitation

The aortic root is normal size.

The inferior vena cava appeared normal and decreased > 50% with respiration

(RAP 5-10 mmHg)

There is no pericardial effusion.



MMode/2D Measurements & Calculations

RVDd: 4.3 cm  LVIDd: 4.7 cm   FS: 30.4 %             Ao root diam: 3.2 cm



IVSd: 1.3 cm  LVIDs: 3.2 cm   EDV(Teich): 100.5 ml   Ao root area: 7.9 cm2

              LVPWd: 1.3 cm   ESV(Teich): 42.4 ml    LA dimension: 3.6 cm

                              EF(Teich): 57.9 %



Doppler Measurements & Calculations

MV E max raul:      MV P1/2t max raul:     Ao V2 max:         LV V1 max P.7 cm/sec        99.2 cm/sec           198.8 cm/sec       7.5 mmHg

MV A max raul:      MV P1/2t: 69.8 msec   Ao max PG:         LV V1 max:

121.9 cm/sec       MVA(P1/2t): 3.2 cm2   15.8 mmHg          137.1 cm/sec

MV E/A: 0.82       MV dec slope:



                   416.4 cm/sec2

                   MV dec time: 0.23 sec

        _______________________________________________________________

PA V2 max:         PI end-d raul:         MV P1/2t-pr_phl:

107.1 cm/sec       74.7 cm/sec           69.8 msec

PA max P.6 mmHg





Left Ventricle

The left ventricle is normal in size. There is mild concentric left

ventricular hypertrophy. Left ventricular systolic function is normal. LV EF

is 65%. Doppler measurements suggest impaired left ventricular relaxation,

which is associated with grade I/IV or mild diastolic dysfunction. The left

ventricular wall motion is normal. There is no thrombus. No ASD ,VSD , or PFO

seen.



Right Ventricle

The right ventricle is grossly normal size. The right ventricle is not well

visualized secondary to technical limitations.



Atria

The right atrium is normal. The left atrial size is normal.



Mitral Valve

There is no evidence of mitral valve prolapse. There is no vegetation seen on

the mitral valve. There is no mitral valve stenosis. There is a trace amount

of mitral regurgitation.





Aortic Valve

There is no aortic valvular vegetation. There is aortic sclerosis without

aortic stenosis. There is no LVOT obstruction. No aortic regurgitation is

present.



Tricuspid Valve

There is no tricuspid stenosis. There is a trace amount of tricuspid

regurgitation. Tricuspid regurgitation jet envelope not well defined to

measure RV systolic pressure accurately.



Pulmonic Valve

There is no pulmonic valvular stenosis. There is a trace amount of pulmonic

regurgitation.



Great Vessels

The aortic root is normal size. The inferior vena cava appeared normal and

decreased > 50% with respiration (RAP 5-10 mmHg).



Effusions

There is no pericardial effusion.





_______________________________________________________________________________

_______________________________________________________________________________



Electronically signed by:      Argenis Knight      on 11/10/2019 12:47 PM



CC: FILEMON OSORIO Lakshmi